# Patient Record
Sex: FEMALE | Race: WHITE
[De-identification: names, ages, dates, MRNs, and addresses within clinical notes are randomized per-mention and may not be internally consistent; named-entity substitution may affect disease eponyms.]

---

## 2018-03-15 LAB
ALBUMIN SERPL-MCNC: 3.6 GM/DL (ref 3.4–5)
ALP SERPL-CCNC: 104 U/L (ref 45–117)
ALT SERPL-CCNC: 29 U/L (ref 12–78)
AST SERPL-CCNC: 27 U/L (ref 15–37)
BASOPHILS # BLD: 0.01 K/UL (ref 0–0.2)
BASOPHILS NFR BLD: 0.2 %
BUN SERPL-MCNC: 26 MG/DL (ref 7–18)
CALCIUM SERPL-MCNC: 9 MG/DL (ref 8.5–10.1)
CO2 SERPL-SCNC: 28 MMOL/L (ref 21–32)
CREAT SERPL-MCNC: 0.76 MG/DL (ref 0.6–1.2)
EOS ABS #: 0.13 K/UL (ref 0–0.5)
EOSINOPHIL NFR BLD AUTO: 198 K/UL (ref 130–400)
GLUCOSE SERPL-MCNC: 134 MG/DL (ref 70–99)
HCT VFR BLD CALC: 42.3 % (ref 37–47)
HGB BLD-MCNC: 14.2 G/DL (ref 12–16)
IG#: 0.01 K/UL (ref 0–0.02)
IMM GRANULOCYTES NFR BLD AUTO: 34.7 %
INR PPP: 1 (ref 0.9–1.1)
LYMPHOCYTES # BLD: 1.66 K/UL (ref 1.2–3.4)
MCH RBC QN AUTO: 29.7 PG (ref 25–34)
MCHC RBC AUTO-ENTMCNC: 33.6 G/DL (ref 32–36)
MCV RBC AUTO: 88.5 FL (ref 80–100)
MONO ABS #: 0.41 K/UL (ref 0.11–0.59)
MONOCYTES NFR BLD: 8.6 %
NEUT ABS #: 2.57 K/UL (ref 1.4–6.5)
NEUTROPHILS # BLD AUTO: 2.7 %
NEUTROPHILS NFR BLD AUTO: 53.6 %
PMV BLD AUTO: 10.8 FL (ref 7.4–10.4)
POTASSIUM SERPL-SCNC: 3.6 MMOL/L (ref 3.5–5.1)
PROT SERPL-MCNC: 7.1 GM/DL (ref 6.4–8.2)
PTT PATIENT: 25.8 SECONDS (ref 21–31)
RED CELL DISTRIBUTION WIDTH CV: 12.8 % (ref 11.5–14.5)
RED CELL DISTRIBUTION WIDTH SD: 41.2 FL (ref 36.4–46.3)
SODIUM SERPL-SCNC: 140 MMOL/L (ref 136–145)
WBC # BLD AUTO: 4.79 K/UL (ref 4.8–10.8)

## 2018-03-15 NOTE — PAT MEDICATION INSTRUCTIONS
Service Date


Mar 15, 2018.





Current Home Medication List


Bioflavonoid Products (Grape Seed), 1 TAB PO QAM


Biotin (Biotin), 1 MG PO QAM


Cholecalciferol (Vitamin D3), 1 TAB PO QAM


Cod Liver Oil (Cod Liver Oil), 1 TAB PO QAM


Coenzyme Q10 (Ubidecarenone) (Coq10), 200 MG PO BID


Cyclosporine (Ophth) (Restasis), 1 DROP OP BID


Evening Primrose Oil (Evening Primrose Oil), 2,000 MG PO QAM


Fish Oil (Omega-3), 1 CAP PO BID


Furosemide (Lasix), 40 MG PO QAM


Meloxicam (Mobic), 15 MG PO QPM


Metoprolol Succ (Toprol Xl) (Toprol-Xl), 50 MG PO BID


Multivitamin (Multivitamin), 1 TAB PO QAM


Potassium Ext Rel (Klor-Con), 40 MEQ PO TID


Ranitidine (Zantac), 150 MG PO BID


Sennosides-Docusate Sodium (Stool Softener), 1 TAB PO BID


Simvastatin (Zocor), 10 MG PO QPM


[Calcium Citrate], 2,000 UNITS PO BID


[Diclofenac 1%], 1 DOSE TP BID


[Echinacea], 1 TAB PO BID


[Glucosamine], 1 TAB PO QAM





Medication Instructions


For Your Scheduled Surgery 





- Check with surgeon for instructions: 


Meloxicam (Mobic), 15 MG PO QPM








- Hold the following medications 2 weeks prior to surgery:


Bioflavonoid Products (Grape Seed), 1 TAB PO QAM


Cod Liver Oil (Cod Liver Oil), 1 TAB PO QAM


Evening Primrose Oil (Evening Primrose Oil), 2,000 MG PO QAM


Fish Oil (Omega-3), 1 CAP PO BID


[Echinacea], 1 TAB PO BID


[Glucosamine], 1 TAB PO QAM


Coenzyme Q10 (Ubidecarenone) (Coq10), 200 MG PO BID








- Hold the following medications 24 hours prior to surgery:


[Diclofenac 1%], 1 DOSE TP BID








- Hold the following medications the morning of surgery:


[Calcium Citrate], 2,000 UNITS PO BID


Sennosides-Docusate Sodium (Stool Softener), 1 TAB PO BID


Ranitidine (Zantac), 150 MG PO BID


Multivitamin (Multivitamin), 1 TAB PO QAM


Potassium Ext Rel (Klor-Con), 40 MEQ PO TID


Furosemide (Lasix), 40 MG PO QAM


Biotin (Biotin), 1 MG PO QAM


Cholecalciferol (Vitamin D3), 1 TAB PO QAM








- Take the following medications the morning of surgery with a sip of water:


Metoprolol Succ (Toprol Xl) (Toprol-Xl), 50 MG PO BID


Cyclosporine (Ophth) (Restasis), 1 DROP OP BID








- Take the following medications as scheduled the night before surgery:


[Calcium Citrate], 2,000 UNITS PO BID


Simvastatin (Zocor), 10 MG PO QPM


Sennosides-Docusate Sodium (Stool Softener), 1 TAB PO BID


Ranitidine (Zantac), 150 MG PO BID


Potassium Ext Rel (Klor-Con), 40 MEQ PO TID


Metoprolol Succ (Toprol Xl) (Toprol-Xl), 50 MG PO BID


Cyclosporine (Ophth) (Restasis), 1 DROP OP BID








If you have any questions please call us at [phone] or [phone] or 

[phone]

## 2018-03-16 LAB — HBA1C MFR BLD: 5.5 % (ref 4.5–5.6)

## 2018-03-21 NOTE — HISTORY & PHYSICAL EXAMINATION
DATE OF ADMISSION:  04/03/2018

 

CHIEF COMPLAINT:  Painful right total hip replacement.

 

HISTORY OF PRESENT ILLNESS:  Jayna is a 77-year-old female with a painful

right total hip arthroplasty.  The patient has her hip replaced originally in

2004.  The patient has had pain x1 year.  She rates her pain at 8/10.  She

has had Tylenol and Mobic without relief.  The patient has had notable poly

wear on her x-rays and has now decided to proceed with poly exchange of the

right hip.

 

PAST MEDICAL HISTORY:  Irregular heartbeat, history of TIA,

hypercholesterolemia, and LACTOSE INTOLERANCE.  She denies heart disease,

diabetes or DVT.

 

PAST SURGICAL HISTORY:  Right total hip, left total knee, cholecystectomy,

tonsillectomy, appendectomy, hemorrhoidectomy, tubal ligation, bilateral

carpal tunnel and right CMC joint.

 

SOCIAL HISTORY:  The patient denies alcohol or tobacco use.  She lives in a

2-story home, but functions on one floor.  She is  and retired.

 

FAMILY HISTORY:  Negative for DVT.

 

MEDICATIONS:  Simvastatin 10 mg daily, meloxicam 15 mg daily, Nexium 20 mg

daily, Lasix 40 mg daily, calcium 250 mg, multivitamin, fish oil, CoQ10, ____

mg, cod liver oil, ____, vitamin D3, metoprolol 25 mg, and Zantac 75 mg.

 

ALLERGIES:  PENICILLIN, LODINE, BAND-AIDS AND ADHESIVE.

 

REVIEW OF SYSTEMS:  See HPI.  Ten other systems reviewed, all negative.

 

PHYSICAL EXAMINATION:

VITAL SIGNS:  Height 5 feet 1 inch, weight 142 pounds, and BMI 27.

GENERAL:  This is a well-developed and well-nourished female, who is alert

and oriented x3.  Mood and affect are appropriate.

HEENT:  Normocephalic and atraumatic.  Mucous membranes are moist and intact.

NECK:  Supple without lymphadenopathy.

HEART:  Regular rate and rhythm without murmurs, rubs or gallops.

LUNGS:  Clear to auscultation without wheezes or rhonchi.

ABDOMEN:  Soft and nontender.  Bowel sounds are equal and active.

EXTREMITIES:  No ecchymosis, redness or warmth.  The patient has a lateral

incision that is benign.  She has no erythema.  Thigh and calf are soft and

nontender.  Log roll of the hip reproduces slight pain in the groin.  She is

neurovascularly intact with +5/5 strength.  She walks with an antalgic gait.

 

X-RAY EXAMINATION:  AP and lateral views show right total hip arthroplasty. 

She does have evidence of moderate to severe poly wear of the acetabulum.

 

IMPRESSION:  Poly wear, right total hip arthroplasty.

 

PLAN:  The patient will be admitted for a probable right total hip poly

exchange with possible revision if necessary.  We will plan on aspirin 325 mg

b.i.d. for DVT prophylaxis.  The patient is going to have home health for

physical therapy upon discharge.  Her PCP is Dr. Frankel in [place].

## 2018-04-03 ENCOUNTER — HOSPITAL ENCOUNTER (INPATIENT)
Dept: HOSPITAL 45 - C.ACU | Age: 78
LOS: 2 days | Discharge: HOME HEALTH | DRG: 468 | End: 2018-04-05
Attending: ORTHOPAEDIC SURGERY | Admitting: ORTHOPAEDIC SURGERY
Payer: COMMERCIAL

## 2018-04-03 VITALS
OXYGEN SATURATION: 94 % | DIASTOLIC BLOOD PRESSURE: 62 MMHG | SYSTOLIC BLOOD PRESSURE: 108 MMHG | TEMPERATURE: 97.34 F | HEART RATE: 71 BPM

## 2018-04-03 VITALS — DIASTOLIC BLOOD PRESSURE: 69 MMHG | HEART RATE: 96 BPM | SYSTOLIC BLOOD PRESSURE: 117 MMHG | OXYGEN SATURATION: 96 %

## 2018-04-03 VITALS
SYSTOLIC BLOOD PRESSURE: 116 MMHG | TEMPERATURE: 97.34 F | HEART RATE: 71 BPM | OXYGEN SATURATION: 100 % | DIASTOLIC BLOOD PRESSURE: 60 MMHG

## 2018-04-03 VITALS
TEMPERATURE: 97.34 F | DIASTOLIC BLOOD PRESSURE: 74 MMHG | HEART RATE: 83 BPM | SYSTOLIC BLOOD PRESSURE: 118 MMHG | OXYGEN SATURATION: 98 %

## 2018-04-03 VITALS
TEMPERATURE: 97.52 F | OXYGEN SATURATION: 94 % | HEART RATE: 57 BPM | DIASTOLIC BLOOD PRESSURE: 64 MMHG | SYSTOLIC BLOOD PRESSURE: 114 MMHG

## 2018-04-03 VITALS
HEART RATE: 78 BPM | TEMPERATURE: 97.34 F | DIASTOLIC BLOOD PRESSURE: 64 MMHG | SYSTOLIC BLOOD PRESSURE: 117 MMHG | OXYGEN SATURATION: 97 %

## 2018-04-03 VITALS
WEIGHT: 136.69 LBS | HEIGHT: 61.5 IN | BODY MASS INDEX: 25.48 KG/M2 | BODY MASS INDEX: 25.48 KG/M2 | HEIGHT: 61.5 IN | BODY MASS INDEX: 25.48 KG/M2 | WEIGHT: 136.69 LBS

## 2018-04-03 VITALS — SYSTOLIC BLOOD PRESSURE: 118 MMHG | HEART RATE: 84 BPM | DIASTOLIC BLOOD PRESSURE: 70 MMHG

## 2018-04-03 VITALS
TEMPERATURE: 97.88 F | DIASTOLIC BLOOD PRESSURE: 98 MMHG | SYSTOLIC BLOOD PRESSURE: 141 MMHG | OXYGEN SATURATION: 95 % | HEART RATE: 69 BPM

## 2018-04-03 DIAGNOSIS — E73.9: ICD-10-CM

## 2018-04-03 DIAGNOSIS — T84.418A: Primary | ICD-10-CM

## 2018-04-03 DIAGNOSIS — Z96.652: ICD-10-CM

## 2018-04-03 DIAGNOSIS — Z90.49: ICD-10-CM

## 2018-04-03 DIAGNOSIS — Z88.0: ICD-10-CM

## 2018-04-03 DIAGNOSIS — Z98.51: ICD-10-CM
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
## 2018-04-03 DIAGNOSIS — Z86.73: ICD-10-CM

## 2018-04-03 DIAGNOSIS — Z96.641: ICD-10-CM

## 2018-04-03 DIAGNOSIS — E78.00: ICD-10-CM

## 2018-04-03 PROCEDURE — 0SWR0JZ REVISION OF SYNTHETIC SUBSTITUTE IN RIGHT HIP JOINT, FEMORAL SURFACE, OPEN APPROACH: ICD-10-PCS | Performed by: ORTHOPAEDIC SURGERY

## 2018-04-03 RX ADMIN — POTASSIUM CHLORIDE SCH MEQ: 1500 TABLET, EXTENDED RELEASE ORAL at 21:28

## 2018-04-03 RX ADMIN — VANCOMYCIN HYDROCHLORIDE SCH MLS/HR: 1 INJECTION, POWDER, LYOPHILIZED, FOR SOLUTION INTRAVENOUS at 10:31

## 2018-04-03 RX ADMIN — CELECOXIB SCH MG: 200 CAPSULE ORAL at 16:16

## 2018-04-03 RX ADMIN — DEXAMETHASONE SCH MG: 4 TABLET ORAL at 16:16

## 2018-04-03 RX ADMIN — SODIUM CHLORIDE, SODIUM LACTATE, POTASSIUM CHLORIDE, AND CALCIUM CHLORIDE SCH MLS/HR: 600; 310; 30; 20 INJECTION, SOLUTION INTRAVENOUS at 16:16

## 2018-04-03 RX ADMIN — ACETAMINOPHEN SCH MG: 500 TABLET, COATED ORAL at 21:29

## 2018-04-03 RX ADMIN — FAMOTIDINE SCH MG: 20 TABLET, FILM COATED ORAL at 16:17

## 2018-04-03 RX ADMIN — METOPROLOL SUCCINATE SCH MG: 50 TABLET, EXTENDED RELEASE ORAL at 21:28

## 2018-04-03 RX ADMIN — DEXAMETHASONE SCH MG: 4 TABLET ORAL at 10:33

## 2018-04-03 RX ADMIN — RANITIDINE SCH MG: 150 TABLET ORAL at 21:28

## 2018-04-03 RX ADMIN — FAMOTIDINE SCH MG: 20 TABLET, FILM COATED ORAL at 10:33

## 2018-04-03 RX ADMIN — METOCLOPRAMIDE SCH MG: 10 TABLET ORAL at 16:17

## 2018-04-03 RX ADMIN — SODIUM CHLORIDE SCH MLS/HR: 900 INJECTION, SOLUTION INTRAVENOUS at 17:51

## 2018-04-03 RX ADMIN — ACETAMINOPHEN SCH MG: 500 TABLET, FILM COATED ORAL at 10:33

## 2018-04-03 RX ADMIN — CLINDAMYCIN PHOSPHATE SCH MLS/HR: 150 INJECTION, SOLUTION INTRAMUSCULAR; INTRAVENOUS at 20:19

## 2018-04-03 RX ADMIN — SODIUM CHLORIDE, SODIUM LACTATE, POTASSIUM CHLORIDE, AND CALCIUM CHLORIDE SCH MLS/HR: 600; 310; 30; 20 INJECTION, SOLUTION INTRAVENOUS at 10:03

## 2018-04-03 RX ADMIN — KETOROLAC TROMETHAMINE SCH MG: 15 INJECTION INTRAMUSCULAR; INTRAVENOUS at 23:42

## 2018-04-03 RX ADMIN — DOCUSATE SODIUM SCH MG: 100 CAPSULE, LIQUID FILLED ORAL at 21:28

## 2018-04-03 RX ADMIN — ACETAMINOPHEN SCH MG: 500 TABLET, FILM COATED ORAL at 16:17

## 2018-04-03 RX ADMIN — GABAPENTIN SCH MG: 300 CAPSULE ORAL at 16:16

## 2018-04-03 RX ADMIN — STANDARDIZED SENNA CONCENTRATE SCH MG: 8.6 TABLET ORAL at 21:28

## 2018-04-03 RX ADMIN — GABAPENTIN SCH MG: 300 CAPSULE ORAL at 10:34

## 2018-04-03 RX ADMIN — KETOROLAC TROMETHAMINE SCH MG: 15 INJECTION INTRAMUSCULAR; INTRAVENOUS at 17:52

## 2018-04-03 RX ADMIN — CELECOXIB SCH MG: 200 CAPSULE ORAL at 10:32

## 2018-04-03 RX ADMIN — METOCLOPRAMIDE SCH MG: 10 TABLET ORAL at 10:34

## 2018-04-03 RX ADMIN — VANCOMYCIN HYDROCHLORIDE SCH MLS/HR: 1 INJECTION, POWDER, LYOPHILIZED, FOR SOLUTION INTRAVENOUS at 16:16

## 2018-04-03 RX ADMIN — SIMVASTATIN SCH MG: 10 TABLET, FILM COATED ORAL at 21:28

## 2018-04-03 NOTE — [facility] POST OPERATIVE BRIEF NOTE
Immediate Operative Summary


Operative Date


Apr 3, 2018.





Pre-Operative Diagnosis





Poly wear, right total hip arthroplasty





Post-Operative Diagnosis





Poly wear, right total hip arthroplasty





Procedure(s) Performed





Right Total Hip Revision Arthroplasty, Femoral Head and Liner Exchange





Surgeon


Dr. Fair





Assistant Surgeon(s)


Jennifer Illig, PA-C





Estimated Blood Loss


150 mL





Findings


Consistent with Post-Op Diagnosis





Fluids (cc crystalloids)


1200





Specimens





A: Explanted Hardware right hip





Drains


None





Anesthesia Type


General





Complication(s)


none





Disposition


Disposition:  Recovery Room / PACU

## 2018-04-03 NOTE — DIAGNOSTIC IMAGING REPORT
R PELVIS/UNILATERAL HIP 1 VIEW



CLINICAL HISTORY: Postoperative evaluation.    



COMPARISON: None



FINDINGS:  Alignment of the right hip arthroplasty is anatomic. Hardware is

intact. There is no fracture or unexpected radiopaque foreign body.



IMPRESSION: Expected findings following right hip arthroplasty.







Electronically signed by:  Israel Mcintyre M.D.

4/3/2018 3:40 PM



Dictated Date/Time:  4/3/2018 3:40 PM

## 2018-04-03 NOTE — ORTHOPEDIC PROGRESS NOTE
Orthopedic Progress Note


Date of Service


Apr 3, 2018.





Subjective


Reports: feeling well, pain controlled w PO medications, Denies: complaints, 

chest pain, SOB, nausea / vomiting, light headedness, calf pain


Additional Notes:


Patient seen laying in bed eating dinner, no acute issues, pain well controlled.





Objective


NAD, AOx3





RLE NVSI +EHL/FHL/TA/GS SILT grossly, CR< 2 seconds, +2 DP pulse, compartments 

soft NT, dressing CDI











  Date Time  Temp Pulse Resp B/P (MAP) Pulse Ox O2 Delivery O2 Flow Rate FiO2


 


4/3/18 18:08 36.3 78 18 117/64 (81) 97 Nasal Cannula 2.0 


 


4/3/18 16:55  96 16 117/69 (85) 96 Nasal Cannula 2.0 


 


4/3/18 16:21 36.4 57 16 114/64 (81) 94 Nasal Cannula 2.0 


 


4/3/18 15:50     94 Nasal Cannula 2.0 


 


4/3/18 15:50 36.3 71 14 108/62 (77) 94 Nasal Cannula 2.0 


 


4/3/18 15:50      Nasal Cannula 2.0 


 


4/3/18 15:45 36.8 65 16 109/55 96 Nasal Cannula 2 


 


4/3/18 15:35  76 19 130/49 96 Nasal Cannula 2 


 


4/3/18 15:25  82 17 133/64 99 Oxymask 10 


 


4/3/18 15:15  72 16 110/75 99 Oxymask 10 


 


4/3/18 15:08 36.1 97 16 138/61 100 Oxymask 10 


 


4/3/18 10:11 36.6 69 20 141/98 95 Room Air  











Assessment & Plan


Assessment:


s/p Revision R JENAE, head and liner exchange


Plan:


-Clinda x 24


-DVT ppx - Lovenox Q daily


-WBAT RLE


-PT/OT


-Posterior hip precautions


-PO XR: Well aligned, well fixed JENAE, without evidence of fracture/dislocation


-am labs


-DC planning

## 2018-04-03 NOTE — ANESTHESIOLOGY PROGRESS NOTE
Anesthesia Post Op Note


Date & Time


Apr 3, 2018 at 15:33





Vital Signs


Pain Intensity:  0





Vital Signs Past 12 Hours








  Date Time  Temp Pulse Resp B/P (MAP) Pulse Ox O2 Delivery O2 Flow Rate FiO2


 


4/3/18 15:08 36.1 97 16 138/61 100 Oxymask 10 


 


4/3/18 10:11 36.6 69 20 141/98 95 Room Air  











Notes


Mental Status:  alert / awake / arousable, participated in evaluation


Pt Amnestic to Procedure:  Yes


Nausea / Vomiting:  adequately controlled


Pain:  adequately controlled


Airway Patency, RR, SpO2:  stable & adequate


BP & HR:  stable & adequate


Hydration State:  stable & adequate


Anesthetic Complications:  no major complications apparent

## 2018-04-04 VITALS
TEMPERATURE: 97.88 F | HEART RATE: 70 BPM | OXYGEN SATURATION: 96 % | DIASTOLIC BLOOD PRESSURE: 58 MMHG | SYSTOLIC BLOOD PRESSURE: 94 MMHG

## 2018-04-04 VITALS
TEMPERATURE: 97.88 F | HEART RATE: 68 BPM | SYSTOLIC BLOOD PRESSURE: 98 MMHG | DIASTOLIC BLOOD PRESSURE: 59 MMHG | OXYGEN SATURATION: 99 %

## 2018-04-04 VITALS
OXYGEN SATURATION: 98 % | DIASTOLIC BLOOD PRESSURE: 52 MMHG | TEMPERATURE: 97.7 F | HEART RATE: 64 BPM | SYSTOLIC BLOOD PRESSURE: 92 MMHG

## 2018-04-04 VITALS
TEMPERATURE: 97.88 F | DIASTOLIC BLOOD PRESSURE: 58 MMHG | SYSTOLIC BLOOD PRESSURE: 94 MMHG | OXYGEN SATURATION: 96 % | HEART RATE: 70 BPM

## 2018-04-04 VITALS
TEMPERATURE: 97.52 F | OXYGEN SATURATION: 96 % | HEART RATE: 86 BPM | SYSTOLIC BLOOD PRESSURE: 110 MMHG | DIASTOLIC BLOOD PRESSURE: 67 MMHG

## 2018-04-04 VITALS
OXYGEN SATURATION: 95 % | TEMPERATURE: 98.96 F | DIASTOLIC BLOOD PRESSURE: 65 MMHG | HEART RATE: 80 BPM | SYSTOLIC BLOOD PRESSURE: 110 MMHG

## 2018-04-04 VITALS — SYSTOLIC BLOOD PRESSURE: 96 MMHG | DIASTOLIC BLOOD PRESSURE: 57 MMHG

## 2018-04-04 VITALS — OXYGEN SATURATION: 96 %

## 2018-04-04 VITALS — TEMPERATURE: 98.6 F

## 2018-04-04 LAB
BASOPHILS # BLD: 0 K/UL (ref 0–0.2)
BASOPHILS NFR BLD: 0 %
BUN SERPL-MCNC: 19 MG/DL (ref 7–18)
CALCIUM SERPL-MCNC: 8.1 MG/DL (ref 8.5–10.1)
CO2 SERPL-SCNC: 25 MMOL/L (ref 21–32)
CREAT SERPL-MCNC: 0.57 MG/DL (ref 0.6–1.2)
EOS ABS #: 0 K/UL (ref 0–0.5)
EOSINOPHIL NFR BLD AUTO: 180 K/UL (ref 130–400)
GLUCOSE SERPL-MCNC: 109 MG/DL (ref 70–99)
HCT VFR BLD CALC: 33.2 % (ref 37–47)
HGB BLD-MCNC: 11.3 G/DL (ref 12–16)
IG#: 0.02 K/UL (ref 0–0.02)
IMM GRANULOCYTES NFR BLD AUTO: 8.2 %
INR PPP: 1 (ref 0.9–1.1)
LYMPHOCYTES # BLD: 0.98 K/UL (ref 1.2–3.4)
MCH RBC QN AUTO: 29.9 PG (ref 25–34)
MCHC RBC AUTO-ENTMCNC: 34 G/DL (ref 32–36)
MCV RBC AUTO: 87.8 FL (ref 80–100)
MONO ABS #: 0.82 K/UL (ref 0.11–0.59)
MONOCYTES NFR BLD: 6.9 %
NEUT ABS #: 10.07 K/UL (ref 1.4–6.5)
NEUTROPHILS # BLD AUTO: 0 %
NEUTROPHILS NFR BLD AUTO: 84.7 %
PMV BLD AUTO: 10.5 FL (ref 7.4–10.4)
POTASSIUM SERPL-SCNC: 3.9 MMOL/L (ref 3.5–5.1)
RED CELL DISTRIBUTION WIDTH CV: 12.8 % (ref 11.5–14.5)
RED CELL DISTRIBUTION WIDTH SD: 41 FL (ref 36.4–46.3)
SODIUM SERPL-SCNC: 139 MMOL/L (ref 136–145)
WBC # BLD AUTO: 11.89 K/UL (ref 4.8–10.8)

## 2018-04-04 RX ADMIN — POTASSIUM CHLORIDE SCH MEQ: 1500 TABLET, EXTENDED RELEASE ORAL at 08:27

## 2018-04-04 RX ADMIN — FUROSEMIDE SCH MG: 40 TABLET ORAL at 08:30

## 2018-04-04 RX ADMIN — ACETAMINOPHEN SCH MG: 500 TABLET, COATED ORAL at 13:23

## 2018-04-04 RX ADMIN — RANITIDINE SCH MG: 150 TABLET ORAL at 08:29

## 2018-04-04 RX ADMIN — METOPROLOL SUCCINATE SCH MG: 50 TABLET, EXTENDED RELEASE ORAL at 20:38

## 2018-04-04 RX ADMIN — ENOXAPARIN SODIUM SCH MG: 40 INJECTION SUBCUTANEOUS at 08:26

## 2018-04-04 RX ADMIN — KETOROLAC TROMETHAMINE SCH MG: 15 INJECTION INTRAMUSCULAR; INTRAVENOUS at 05:40

## 2018-04-04 RX ADMIN — ACETAMINOPHEN SCH MG: 500 TABLET, COATED ORAL at 20:39

## 2018-04-04 RX ADMIN — SODIUM CHLORIDE SCH MLS/HR: 900 INJECTION, SOLUTION INTRAVENOUS at 03:58

## 2018-04-04 RX ADMIN — CLINDAMYCIN PHOSPHATE SCH MLS/HR: 150 INJECTION, SOLUTION INTRAMUSCULAR; INTRAVENOUS at 04:18

## 2018-04-04 RX ADMIN — RANITIDINE SCH MG: 150 TABLET ORAL at 20:35

## 2018-04-04 RX ADMIN — ACETAMINOPHEN SCH MG: 500 TABLET, COATED ORAL at 05:39

## 2018-04-04 RX ADMIN — SODIUM CHLORIDE SCH MLS/HR: 900 INJECTION, SOLUTION INTRAVENOUS at 11:41

## 2018-04-04 RX ADMIN — STANDARDIZED SENNA CONCENTRATE SCH MG: 8.6 TABLET ORAL at 21:19

## 2018-04-04 RX ADMIN — POTASSIUM CHLORIDE SCH MEQ: 1500 TABLET, EXTENDED RELEASE ORAL at 20:34

## 2018-04-04 RX ADMIN — KETOROLAC TROMETHAMINE SCH MG: 15 INJECTION INTRAMUSCULAR; INTRAVENOUS at 11:35

## 2018-04-04 RX ADMIN — CELECOXIB SCH MG: 200 CAPSULE ORAL at 20:34

## 2018-04-04 RX ADMIN — PANTOPRAZOLE SCH MG: 40 TABLET, DELAYED RELEASE ORAL at 08:28

## 2018-04-04 RX ADMIN — POTASSIUM CHLORIDE SCH MEQ: 1500 TABLET, EXTENDED RELEASE ORAL at 13:24

## 2018-04-04 RX ADMIN — Medication SCH TAB: at 08:27

## 2018-04-04 RX ADMIN — METOPROLOL SUCCINATE SCH MG: 50 TABLET, EXTENDED RELEASE ORAL at 08:28

## 2018-04-04 RX ADMIN — DOCUSATE SODIUM SCH MG: 100 CAPSULE, LIQUID FILLED ORAL at 08:26

## 2018-04-04 RX ADMIN — DOCUSATE SODIUM SCH MG: 100 CAPSULE, LIQUID FILLED ORAL at 20:39

## 2018-04-04 RX ADMIN — SIMVASTATIN SCH MG: 10 TABLET, FILM COATED ORAL at 21:16

## 2018-04-04 NOTE — ORTHOPEDIC PROGRESS NOTE
Orthopedic Progress Note


Date of Service


Apr 4, 2018.





Subjective


Post OP Day:  1


Reports: feeling well, Denies: chest pain, SOB, nausea / vomiting, light 

headedness, calf pain





Objective


calves soft nontender, N/V intact, hip located, dressing C/D/I, A&O x3, toes 

mobile











  Date Time  Temp Pulse Resp B/P (MAP) Pulse Ox O2 Delivery O2 Flow Rate FiO2


 


4/4/18 07:28      Room Air  


 


4/4/18 03:00 36.4 86 18 110/67 (81) 96 Room Air  


 


4/3/18 23:40      Room Air  


 


4/3/18 23:00 36.3 83 18 118/74 (89) 98 Room Air  


 


4/3/18 21:24  84  118/70 (86)    


 


4/3/18 18:56 36.3 71 18 116/60 (78) 100 Nasal Cannula 2.0 


 


4/3/18 18:08 36.3 78 18 117/64 (81) 97 Nasal Cannula 2.0 


 


4/3/18 16:55  96 16 117/69 (85) 96 Nasal Cannula 2.0 


 


4/3/18 16:21 36.4 57 16 114/64 (81) 94 Nasal Cannula 2.0 


 


4/3/18 15:50     94 Nasal Cannula 2.0 


 


4/3/18 15:50 36.3 71 14 108/62 (77) 94 Nasal Cannula 2.0 


 


4/3/18 15:50      Nasal Cannula 2.0 


 


4/3/18 15:45 36.8 65 16 109/55 96 Nasal Cannula 2 


 


4/3/18 15:35  76 19 130/49 96 Nasal Cannula 2 


 


4/3/18 15:25  82 17 133/64 99 Oxymask 10 


 


4/3/18 15:15  72 16 110/75 99 Oxymask 10 


 


4/3/18 15:08 36.1 97 16 138/61 100 Oxymask 10 


 


4/3/18 10:11 36.6 69 20 141/98 95 Room Air  








Laboratory Results 24 Hours:











Test


  4/4/18


05:50


 


White Blood Count 11.89 K/uL 


 


Red Blood Count 3.78 M/uL 


 


Hemoglobin 11.3 g/dL 


 


Hematocrit 33.2 % 


 


Mean Corpuscular Volume 87.8 fL 


 


Mean Corpuscular Hemoglobin 29.9 pg 


 


Mean Corpuscular Hemoglobin


Concent 34.0 g/dl 


 


 


Platelet Count 180 K/uL 


 


Mean Platelet Volume 10.5 fL 


 


Neutrophils (%) (Auto) 84.7 % 


 


Lymphocytes (%) (Auto) 8.2 % 


 


Monocytes (%) (Auto) 6.9 % 


 


Eosinophils (%) (Auto) 0.0 % 


 


Basophils (%) (Auto) 0.0 % 


 


Neutrophils # (Auto) 10.07 K/uL 


 


Lymphocytes # (Auto) 0.98 K/uL 


 


Monocytes # (Auto) 0.82 K/uL 


 


Eosinophils # (Auto) 0.00 K/uL 


 


Basophils # (Auto) 0.00 K/uL 


 


Prothromb Time International


Ratio 1.0 


 


 


Prothrombin Time 10.1 SECONDS 











Assessment & Plan


Assessment:


POD#1 s/p Revision R JENAE, head and liner exchange


Plan:


-Clinda x 24


-DVT ppx - Lovenox Q daily


-WBAT RLE


-PT/OT


-Posterior hip precautions


-PO XR: Well aligned, well fixed JENAE, without evidence of fracture/dislocation


-am labs


-DC planning- Plan for DC home with  tomorrow 4/5/18

## 2018-04-04 NOTE — DISCHARGE INSTRUCTIONS
Discharge Instructions


Date of Service


Apr 4, 2018.





Admission


Reason for Admission:  Failed Right Hip Prosthesis





Discharge


Discharge Diagnosis / Problem:  SP RIGHT JENAE REVISION





Discharge Goals


Goal(s):  Decrease discomfort, Improve function, Increase independence





Activity Recommendations


Activity Limitations:  per Instructions/Follow-up section





.





Instructions / Follow-Up


Instructions / Follow-Up


ACTIVITY RECOMMENDATIONS:





SELF CARE INSTRUCTIONS AFTER TOTAL HIP REPLACEMENT





Until the incision and soft tissues around your hip have healed, there is


a possibility that the hip prosthesis could dislocate.





A.  Observe the following precautions to prevent dislocation:


   1.  Don't bend your hip greater than 90 degrees.


   2.  Avoid crossing your legs or ankles while standing or lying.


   3.  Sit with your feet placed 6 inches apart.


   4.  When sitting, keep your knees below your hips.  Sit on a firm 


        surface, avoid deep, soft chairs and couches.  Use an elevated


        toilet seat in the bathroom.


   5.  Don't bend over at the waist.  Use a long handled shoehorn and


        a sock aid to help you put on your shoes and socks.  A reacher


        can help you  objects that are too high or too low to reach.


   6.  Keep car riding to a minimum for at least one month after surgery.





B.  Your balance may be shaky for a while.  Use crutches or a walker until 


     directed by your doctor.





C.  Use hand rails when walking on stairs.





D.  Wear low heeled shoes with non-slip soles.





E.  Be sure that your floors are free of things that could trip you - throw rugs

,


     electrical cords, small objects.  Avoid wet and waxed floors, especially 

with


     crutches and canes.





F.  Try to walk several times a day with rest periods between.





G.  Continue with all the exercises taught to you in the hospital.  Again, make 


     walking a part of your daily routine.








SPECIAL CARE INSTRUCTIONS:





**VERY IMPORTANT TO READ AND REVIEW**





A.  You may still be at risk for phlebitis and blood clots.





   1.  Wear surgical stockings (CHARLINE hose) for 2 weeks after surgery to improve


        circulation and reduce swelling.


   2.  Take EC Aspirin 81 mg twice daily for 4 weeks or as directed by your 

doctor.  This


                 is your blood thinner. DO NOT START UNTIL AFTER 2 WEEKS OF 

LOVENOX COMPLETE


   3.  High risk patients may be prescribed a stronger blood thinner if 

necessary. ---> Lovenox


   4.  If you are on Coumadin normally, your family doctor/cardiologist should 

monitor 


                 your blood work.  Expect a phone call the day of or the day 

after bloodwork is


                 drawn to adjust your dosage.





B.  You must take antibiotics before having dental work, bladder, bowel and 

other


     surgery.  Your doctor will provide you with a permanent card to carry 

describing 


     precautions.  





C.  Call [facility] if you have a fever, redness or swelling 

around


     the incision, cloudy drainage from incision, or sudden increase in pain in 

your hip, not


     relieved by your regular pain medication.  





D.  Please call the office at [phone] if you have any concerns or 

questions about


     your operation or recovery.





*  YOU MAY SHOWER, NO TUB BATHS UNTIL CLEARED BY YOUR DOCTOR.





*  WEAR CHARLINE HOSE 20 HOURS PER DAY FOR 2 WEEKS.





*  YOU SHOULD USE A WALKER OR CRUTCHES FOR 2-4 WEEKS.  THIS WILL HELP PREVENT


    STRAIN ON YOUR HIP MUSCLE AND ALLOW IT TO HEAL PROPERLY. YOU MAY WEAN TO A


    CANE AS TOLERATED.





*  MOST PATIENTS WILL HAVE HOME NURSING FOR THERAPY.  IF YOU DECIDE TO DO 


   OUTPATIENT PHYSICAL THERAPY, PLEASE SCHEDULE THIS 3 TIMES PER WEEK.





*DERMABOND Prineo- This is a mesh tape dressing that is covered with glue.  It 

should remain in place until the incision is properly healed, usually 10-14 

days.  This dressing is designed to naturally slough off.  You may trim the 

excess mesh tape as it peels off.  Incision may be briefly wet in a shower.   

Dry immediately by blotting with a clean, dry towel.  Do not bath or swim until 

instructed by your doctor.  Do not scratch, rub, or pick at the dressing.  Do 

not apply any topical ointments or lotions until dressing is completely removed 

and/or instructed by your doctor.  


There may be a small piece of suture material at one end of your incision.  Do 

not pull or trim this.  If it is bothersome or catching on clothing, you may 

cover it with a band-aid.








FOLLOW UP VISIT:





If appointment is not already scheduled:





Please call [facility] to make a follow-up appointment for 


2 weeks after your surgery at [phone].





Current Hospital Diet


Patient's current hospital diet: AHA Diet (Heart Healthy)





Discharge Diet


Recommended Diet:  Regular Diet





Procedures


Procedures Performed:  


Right Total Hip Revision Arthroplasty, Femoral Head and Liner Exchange





Pending Studies


Studies pending at discharge:  no





Laboratory Results





Hemoglobin A1c








Test


  3/15/18


14:45 Range/Units


 


 


Estimated Average Glucose 111   mg/dl


 


Hemoglobin A1c 5.5  4.5-5.6  %











Medical Emergencies








.


Who to Call and When:





Medical Emergencies:  If at any time you feel your situation is an emergency, 

please call 911 immediately.





.





Non-Emergent Contact


Non-Emergency issues call your:  Surgeon


.








"Provider Documentation" section prepared by Jennifer M. Illig.








.

## 2018-04-04 NOTE — ANESTHESIOLOGY PROGRESS NOTE
Anesthesia Post Op Note


Date & Time


Apr 4, 2018 at 10:46





Vital Signs





Vital Signs Past 12 Hours








  Date Time  Temp Pulse Resp B/P (MAP) Pulse Ox O2 Delivery O2 Flow Rate FiO2


 


4/4/18 09:32 36.6 70 18  96 Room Air  


 


4/4/18 08:21     96 Room Air  


 


4/4/18 07:54 36.6 70 18 94/58 (70) 96 Room Air  


 


4/4/18 07:28      Room Air  


 


4/4/18 03:00 36.4 86 18 110/67 (81) 96 Room Air  


 


4/3/18 23:40      Room Air  


 


4/3/18 23:00 36.3 83 18 118/74 (89) 98 Room Air  











Notes


Mental Status:  alert / awake / arousable, participated in evaluation


Pt Amnestic to Procedure:  Yes


Nausea / Vomiting:  adequately controlled


Pain:  adequately controlled


Airway Patency, RR, SpO2:  stable & adequate


BP & HR:  stable & adequate


Hydration State:  stable & adequate


Anesthetic Complications:  no major complications apparent

## 2018-04-05 VITALS
DIASTOLIC BLOOD PRESSURE: 60 MMHG | TEMPERATURE: 98.06 F | OXYGEN SATURATION: 97 % | HEART RATE: 87 BPM | SYSTOLIC BLOOD PRESSURE: 105 MMHG

## 2018-04-05 VITALS — OXYGEN SATURATION: 97 %

## 2018-04-05 RX ADMIN — POTASSIUM CHLORIDE SCH MEQ: 1500 TABLET, EXTENDED RELEASE ORAL at 09:23

## 2018-04-05 RX ADMIN — METOPROLOL SUCCINATE SCH MG: 50 TABLET, EXTENDED RELEASE ORAL at 09:24

## 2018-04-05 RX ADMIN — ENOXAPARIN SODIUM SCH MG: 40 INJECTION SUBCUTANEOUS at 09:22

## 2018-04-05 RX ADMIN — FUROSEMIDE SCH MG: 40 TABLET ORAL at 09:00

## 2018-04-05 RX ADMIN — CELECOXIB SCH MG: 200 CAPSULE ORAL at 09:24

## 2018-04-05 RX ADMIN — ACETAMINOPHEN SCH MG: 500 TABLET, COATED ORAL at 05:43

## 2018-04-05 RX ADMIN — Medication SCH TAB: at 09:24

## 2018-04-05 RX ADMIN — DOCUSATE SODIUM SCH MG: 100 CAPSULE, LIQUID FILLED ORAL at 09:24

## 2018-04-05 RX ADMIN — PANTOPRAZOLE SCH MG: 40 TABLET, DELAYED RELEASE ORAL at 09:24

## 2018-04-05 RX ADMIN — RANITIDINE SCH MG: 150 TABLET ORAL at 09:26

## 2018-04-05 NOTE — ORTHOPEDIC PROGRESS NOTE
Orthopedic Progress Note


Date of Service


Apr 5, 2018.





Subjective


Additional Notes:


Patient seen sitting in chair, comfortable, no acute issues overnight, 

ambulating well.  Denies N/V/CP/SOB.





Objective


NAD, AOX3





RLE NVSI +EHL/FHL/TA/GS SILT grossly, CR< 2 seconds, compartments soft NT, 

dressing cdi











  Date Time  Temp Pulse Resp B/P (MAP) Pulse Ox O2 Delivery O2 Flow Rate FiO2


 


4/4/18 23:35      Room Air  


 


4/4/18 22:50 37.2 80 16 110/65 (80) 95 Room Air  


 


4/4/18 17:55 37.0       


 


4/4/18 15:10      Room Air  


 


4/4/18 15:04 36.6 68 18 98/59 (72) 99 Room Air  


 


4/4/18 11:47 36.5 64 18 92/52 (65) 98 Room Air  


 


4/4/18 09:32 36.6 70 18  96 Room Air  


 


4/4/18 08:21     96 Room Air  


 


4/4/18 07:54 36.6 70 18 94/58 (70) 96 Room Air  











Assessment & Plan


Assessment:


POD#2 s/p Revision R JENAE, head and liner exchange


Plan:


-Clinda x 24


-DVT ppx - Lovenox Q daily --> 81mg asa BID


-WBAT RLE


-PT/OT


-Posterior hip precautions


-Pain control - Ultram/tylenol


-PO XR: Well aligned, well fixed JENAE, without evidence of fracture/dislocation


-DC planning- Plan for DC home with HC today 4/5/18